# Patient Record
Sex: FEMALE | Race: WHITE
[De-identification: names, ages, dates, MRNs, and addresses within clinical notes are randomized per-mention and may not be internally consistent; named-entity substitution may affect disease eponyms.]

---

## 2017-08-27 NOTE — EDM.PDOC
ED HPI GENERAL MEDICAL PROBLEM





- General


Chief Complaint: Genitourinary Problem


Stated Complaint: URINARY TRACT INFECTION


Time Seen by Provider: 17 11:15


Source of Information: Reports: Patient


History Limitations: Reports: No Limitations





- History of Present Illness


INITIAL COMMENTS - FREE TEXT/NARRATIVE: 


Janae is an otherwise healthy 36-year-old female who presents to the emergency 

department today with complaints of right lower pelvic pain upon urination. 

Patient was seen in the clinic on Wednesday and diagnosed with a urinary tract 

infection, she was started on a seven-day course of ciprofloxacin. Patient 

endorses dysuria, states she felt very nauseated and ill on Friday at work, 

yesterday she reports she actually felt much better however she was having left 

ankle pain, secondary to concerns for tendon rupture on quinolones, patient 

discontinued the ciprofloxacin yesterday and woke up today with return of her 

symptoms. Patient denies any fever at home, she does endorse nausea and vomiting

, she denies any diarrhea. Patient reports she has mild dysuria. Patient denies 

any violeta blood in her urine. The patient has an IUD and is uncertain of her 

last menstrual cycle.


Onset: Today


  ** Right Lower Abdomen


Pain Score (Numeric/FACES): 8





- Related Data


 Allergies











Allergy/AdvReac Type Severity Reaction Status Date / Time


 


No Known Allergies Allergy   Verified 17 11:01














Past Medical History





- Past Surgical History


GI Surgical History: Reports: Cholecystectomy


Female  Surgical History: Reports:  Section





Social & Family History





- Tobacco Use


Smoking Status *Q: Current Every Day Smoker


Years of Tobacco use: 10


Packs/Tins Daily: 0.5





- Caffeine Use


Caffeine Use: Reports: None





- Recreational Drug Use


Recreational Drug Use: No





ED ROS GENERAL





- Review of Systems


Review Of Systems: See Below


Constitutional: Reports: Decreased Appetite


HEENT: Reports: No Symptoms


Respiratory: Reports: No Symptoms


Cardiovascular: Reports: No Symptoms


Endocrine: Reports: No Symptoms


GI/Abdominal: Reports: Nausea, Vomiting


: Reports: Dysuria, Other (Pelvic pain)


Skin: Reports: No Symptoms


Neurological: Reports: No Symptoms


Psychiatric: Reports: No Symptoms


Hematologic/Lymphatic: Reports: No Symptoms


Immunologic: Reports: No Symptoms





ED EXAM, RENAL/





- Physical Exam


Exam: See Below


Exam Limited By: No Limitations


General Appearance: Alert, WD/WN


Nose: Normal Inspection


Head: Atraumatic


Neck: Normal Inspection, Supple, Non-Tender


Respiratory/Chest: No Respiratory Distress, Lungs Clear


Cardiovascular: Normal Peripheral Pulses, Regular Rate, Rhythm, No Murmur


GI/Abdominal: Normal Bowel Sounds, Soft, Tender (Right lower quadrant, pelvic 

tenderness)


Back Exam: No: CVA Tenderness (R), CVA Tenderness (L)


Extremities: Normal Inspection


Neurological: Alert, Oriented, CN II-XII Intact


Psychiatric: Normal Affect, Normal Mood


Skin Exam: Warm, Dry, Intact, Pallor


Lymphatic: No Adenopathy





Course





- Vital Signs


Last Recorded V/S: 


 Last Vital Signs











Temp  35.7 C   17 11:


 


Pulse  82   17 11:


 


Resp  16   17 11:


 


BP  152/94 H  17 11:


 


Pulse Ox  98   17 11:01








Janae is a 32-year-old female who presents to the emergency department today 

with ongoing right pelvic pain. Please refer to history of present illness and 

focused exam. Patient on exam is tender, she is nontoxic appearing, she is well 

hydrated. Patient declined offer of any pain medication, she did accept 1 g of 

Tylenol and was given 1 L of IV fluids. Patient initially complained of the 

inability to urinate, she was bladder scanned by nursing staff and found to 

have 100 mL's of urine in her bladder. Patient finished a 3 day course of 

ciprofloxacin, urinalysis today is negative for infection. There is no blood in 

patient's urine. CT scan was obtained to rule out ureteral stone as patient did 

have blood in her urine on Wednesday. This was negative for a stone but did 

reveal a 5 x 4 cm cyst to her right ovary which is likely the source of patient'

s pain. I discussed findings of today's exam with patient, she is feeling much 

better after fluids here. I highly encouraged her to follow up with a GYN 

doctor in the next week to discuss her ovarian cyst and recommended ibuprofen 

and Tylenol for her pain as well as staying well-hydrated. Patient's CBC is 

unremarkable, CMP is within normal limits. I feel she is stable to be 

discharged home with close follow-up. Patient was agreeable to plan of care, 

reasons to return to the emergency department were discussed, patient was 

discharged in stable condition.


Of note, urine culture from Wednesday did not grow out any bacteria.





- Orders/Labs/Meds


Orders: 


 Active Orders 24 hr











 Category Date Time Status


 


 Peripheral IV Care [RC] .AS DIRECTED Care  17 11:18 Active


 


 Abdomen Pelvis wo Cont [CT] Stat Exams  17 12:05 Taken


 


 Sodium Chloride 0.9% [Saline Flush] Med  17 11:18 Active





 10 ml FLUSH ASDIRECTED PRN   


 


 Peripheral IV Insertion Adult [OM.PC] Routine Oth  17 11:18 Ordered








 Medication Orders





Sodium Chloride (Saline Flush)  10 ml FLUSH ASDIRECTED PRN


   PRN Reason: Keep Vein Open


   Last Admin: 17 11:32  Dose: 10 ml








Labs: 


 Laboratory Tests











  17 Range/Units





  11:28 11:28 11:28 


 


WBC  7.2    (4.5-11.0)  K/uL


 


RBC  4.20    (3.30-5.50)  M/uL


 


Hgb  12.6    (12.0-15.0)  g/dL


 


Hct  38.2    (36.0-48.0)  %


 


MCV  91    (80-98)  fL


 


MCH  30    (27-31)  pg


 


MCHC  33    (32-36)  %


 


Plt Count  250    (150-400)  K/uL


 


Neut % (Auto)  64    (36-66)  %


 


Lymph % (Auto)  26    (24-44)  %


 


Mono % (Auto)  8 H    (2-6)  %


 


Eos % (Auto)  2    (2-4)  %


 


Baso % (Auto)  1    (0-1)  %


 


Sodium   138 L   (140-148)  mmol/L


 


Potassium   3.7   (3.6-5.2)  mmol/L


 


Chloride   106   (100-108)  mmol/L


 


Carbon Dioxide   27   (21-32)  mmol/L


 


Anion Gap   8.7   (5.0-14.0)  mmol/L


 


BUN   9   (7-18)  mg/dL


 


Creatinine   0.7   (0.6-1.0)  mg/dL


 


Est Cr Clr Drug Dosing   112.20   mL/min


 


Estimated GFR (MDRD)   > 60   (>60)  


 


Glucose   83   ()  mg/dL


 


Calcium   8.2 L   (8.5-10.1)  mg/dL


 


Total Bilirubin   0.3   (0.2-1.0)  mg/dL


 


AST   22   (15-37)  U/L


 


ALT   30   (12-78)  U/L


 


Alkaline Phosphatase   62   ()  U/L


 


Total Protein   7.1   (6.4-8.2)  g/dL


 


Albumin   3.4   (3.4-5.0)  g/dL


 


Globulin   3.7 H   (2.3-3.5)  g/dL


 


Albumin/Globulin Ratio   0.9 L   (1.2-2.2)  


 


HCG, Qual    Negative  


 


Urine Color     


 


Urine Appearance     


 


Urine pH     (4.5-8.0)  


 


Ur Specific Gravity     (1.008-1.030)  


 


Urine Protein     (NEGATIVE)  mg/dL


 


Urine Glucose (UA)     (NEGATIVE)  mg/dL


 


Urine Ketones     (NEGATIVE)  mg/dL


 


Urine Occult Blood     (NEGATIVE)  


 


Urine Nitrite     (NEGATIVE)  


 


Urine Bilirubin     (NEGATIVE)  


 


Urine Urobilinogen     (NORMAL)  mg/dL


 


Ur Leukocyte Esterase     (NEGATIVE)  


 


Urine RBC     (0-5)  


 


Urine WBC     (0-5)  


 


Ur Epithelial Cells     


 


Amorphous Sediment     


 


Urine Bacteria     


 


Urine Mucus     














  17 Range/Units





  12:25 


 


WBC   (4.5-11.0)  K/uL


 


RBC   (3.30-5.50)  M/uL


 


Hgb   (12.0-15.0)  g/dL


 


Hct   (36.0-48.0)  %


 


MCV   (80-98)  fL


 


MCH   (27-31)  pg


 


MCHC   (32-36)  %


 


Plt Count   (150-400)  K/uL


 


Neut % (Auto)   (36-66)  %


 


Lymph % (Auto)   (24-44)  %


 


Mono % (Auto)   (2-6)  %


 


Eos % (Auto)   (2-4)  %


 


Baso % (Auto)   (0-1)  %


 


Sodium   (140-148)  mmol/L


 


Potassium   (3.6-5.2)  mmol/L


 


Chloride   (100-108)  mmol/L


 


Carbon Dioxide   (21-32)  mmol/L


 


Anion Gap   (5.0-14.0)  mmol/L


 


BUN   (7-18)  mg/dL


 


Creatinine   (0.6-1.0)  mg/dL


 


Est Cr Clr Drug Dosing   mL/min


 


Estimated GFR (MDRD)   (>60)  


 


Glucose   ()  mg/dL


 


Calcium   (8.5-10.1)  mg/dL


 


Total Bilirubin   (0.2-1.0)  mg/dL


 


AST   (15-37)  U/L


 


ALT   (12-78)  U/L


 


Alkaline Phosphatase   ()  U/L


 


Total Protein   (6.4-8.2)  g/dL


 


Albumin   (3.4-5.0)  g/dL


 


Globulin   (2.3-3.5)  g/dL


 


Albumin/Globulin Ratio   (1.2-2.2)  


 


HCG, Qual   


 


Urine Color  Yellow  


 


Urine Appearance  Clear  


 


Urine pH  6.5  (4.5-8.0)  


 


Ur Specific Gravity  1.010  (1.008-1.030)  


 


Urine Protein  Negative  (NEGATIVE)  mg/dL


 


Urine Glucose (UA)  Normal  (NEGATIVE)  mg/dL


 


Urine Ketones  Negative  (NEGATIVE)  mg/dL


 


Urine Occult Blood  Negative  (NEGATIVE)  


 


Urine Nitrite  Negative  (NEGATIVE)  


 


Urine Bilirubin  Negative  (NEGATIVE)  


 


Urine Urobilinogen  Normal  (NORMAL)  mg/dL


 


Ur Leukocyte Esterase  Negative  (NEGATIVE)  


 


Urine RBC  0-5  (0-5)  


 


Urine WBC  0-5  (0-5)  


 


Ur Epithelial Cells  Few  


 


Amorphous Sediment  Not seen  


 


Urine Bacteria  Not seen  


 


Urine Mucus  Rare  











Meds: 


Medications











Generic Name Dose Route Start Last Admin





  Trade Name Freq  PRN Reason Stop Dose Admin


 


Sodium Chloride  10 ml  17 11:18  17 11:32





  Saline Flush  FLUSH   10 ml





  ASDIRECTED PRN   Administration





  Keep Vein Open   














Discontinued Medications














Generic Name Dose Route Start Last Admin





  Trade Name Freq  PRN Reason Stop Dose Admin


 


Acetaminophen  1,000 mg  17 11:18  17 11:40





  Tylenol Extra Strength  PO  17 11:19  1,000 mg





  ONETIME ONE   Administration


 


Sodium Chloride  1,000 mls @ 999 mls/hr  17 11:19  17 11:32





  Normal Saline  IV  17 12:19  999 mls/hr





  .BOLUS ONE   Administration














Departure





- Departure


Time of Disposition: 14:00


Disposition: Home, Self-Care 01


Condition: Good


Clinical Impression: 


 Pelvic pain





Ovarian cyst


Qualifiers:


 Laterality: right Qualified Code(s): N83.201 - Unspecified ovarian cyst, right 

side








- Discharge Information


Instructions:  Ovarian Cyst


Referrals: 


PCP,None [Primary Care Provider] - 


Forms:  ED Department Discharge


Additional Instructions: 


Janae, he can take 600 mg of ibuprofen every 6 hours as needed for pain. You 

can alternate this with 1000 mg of Tylenol every 6 hours as well.


The size of your ovarian cyst is 5 x 4 cm which is a decent sized cyst so I 

would like you to follow up with a GYN doctor in the next week for follow-up. 

Make sure you are drinking plenty of fluids, return to the emergency department 

with any complications or worsening symptoms.








- My Orders


Last 24 Hours: 


My Active Orders





17 11:18


Peripheral IV Care [RC] .AS DIRECTED 


Sodium Chloride 0.9% [Saline Flush]   10 ml FLUSH ASDIRECTED PRN 


Peripheral IV Insertion Adult [OM.PC] Routine 





17 12:05


Abdomen Pelvis wo Cont [CT] Stat 














- Assessment/Plan


Last 24 Hours: 


My Active Orders





17 11:18


Peripheral IV Care [RC] .AS DIRECTED 


Sodium Chloride 0.9% [Saline Flush]   10 ml FLUSH ASDIRECTED PRN 


Peripheral IV Insertion Adult [OM.PC] Routine 





17 12:05


Abdomen Pelvis wo Cont [CT] Stat

## 2020-06-19 ENCOUNTER — HOSPITAL ENCOUNTER (EMERGENCY)
Dept: HOSPITAL 11 - JP.ED | Age: 35
Discharge: HOME | End: 2020-06-19
Payer: COMMERCIAL

## 2020-06-19 VITALS — HEART RATE: 56 BPM | DIASTOLIC BLOOD PRESSURE: 73 MMHG | SYSTOLIC BLOOD PRESSURE: 124 MMHG

## 2020-06-19 DIAGNOSIS — F17.210: ICD-10-CM

## 2020-06-19 DIAGNOSIS — R05: Primary | ICD-10-CM

## 2020-06-19 PROCEDURE — 94640 AIRWAY INHALATION TREATMENT: CPT

## 2020-06-19 PROCEDURE — 99285 EMERGENCY DEPT VISIT HI MDM: CPT

## 2020-06-19 PROCEDURE — 71046 X-RAY EXAM CHEST 2 VIEWS: CPT

## 2020-06-19 NOTE — CR
CHEST: 2 view

 

CLINICAL HISTORY:Cough

 

COMPARISON:None

 

FINDINGS:  The heart size, pulmonary vascularity and hilar structures are

normal. No infiltrate effusion or pneumothorax is seen.

 

IMPRESSION: No acute cardiopulmonary process.

## 2020-06-19 NOTE — EDM.PDOC
ED HPI GENERAL MEDICAL PROBLEM





- General


Chief Complaint: Respiratory Problem


Stated Complaint: SHORTNESS OF BREATH


Time Seen by Provider: 20 13:23


Source of Information: Reports: Patient, Old Records, RN Notes Reviewed


History Limitations: Reports: No Limitations





- History of Present Illness


INITIAL COMMENTS - FREE TEXT/NARRATIVE: 





35-year-old female presents emergency department a complaint of shortness of 

breath, she does have a history of reactive airway disease particularly when she

is exposed to various chemical products at work.  She is currently being 

evaluated by her primary care provider underwent pulmonary function test last 

week which demonstrated a mild obstructive pattern however she had a negative 

response to the albuterol on the pulmonary function test.  She states she has 

been using albuterol for some time and it has helped her whenever she is felt 

short of breath, does have an extensive tobacco history use





- Related Data


                                    Allergies











Allergy/AdvReac Type Severity Reaction Status Date / Time


 


No Known Allergies Allergy   Verified 20 13:19











Home Meds: 


                                    Home Meds





Albuterol Sulfate [Albuterol Sulfate Hfa] 18 gm IH ASDIRECTED PRN 20 

[History]


Levalbuterol Tartrate [Xopenex HFA] 1 puff INH Q6H PRN #1 inhaler 20 [Rx]











Past Medical History


Respiratory History: Reports: Other (See Below)


Other Respiratory History: Reactive airway disease to certain irritants


Genitourinary History: Reports: Other (See Below)


Other Genitourinary History: uti long ago


OB/GYN History: Reports: Other (See Below)


Other OB/GYN History: cyst removed





- Past Surgical History


GI Surgical History: Reports: Cholecystectomy


Female  Surgical History: Reports:  Section





Social & Family History





- Tobacco Use


Smoking Status *Q: Current Every Day Smoker


Years of Tobacco use: 12


Packs/Tins Daily: 0.5





- Caffeine Use


Caffeine Use: Reports: Coffee





- Recreational Drug Use


Recreational Drug Use: No





ED ROS GENERAL





- Review of Systems


Review Of Systems: See Below


Constitutional: Reports: No Symptoms


Respiratory: Reports: Shortness of Breath, Wheezing, Cough.  Denies: Sputum


Cardiovascular: Reports: No Symptoms





ED EXAM, GENERAL





- Physical Exam


Exam: See Below


Exam Limited By: No Limitations


General Appearance: Alert, WD/WN, No Apparent Distress


Respiratory/Chest: No Respiratory Distress, Decreased Breath Sounds, Wheezing


Cardiovascular: Regular Rate, Rhythm, No Murmur





Course





- Vital Signs


Last Recorded V/S: 


                                Last Vital Signs











Temp  96.8 F L  20 13:37


 


Pulse  56 L  20 13:37


 


Resp  12   20 13:37


 


BP  124/73   20 13:37


 


Pulse Ox  99   20 13:37














- Orders/Labs/Meds


Orders: 


                               Active Orders 24 hr











 Category Date Time Status


 


 RT Aerosol Therapy [RC] ASDIRECTED Care  20 14:21 Active


 


 Chest 2V [CR] Urgent Exams  20 14:12 Taken











Meds: 


Medications














Discontinued Medications














Generic Name Dose Route Start Last Admin





  Trade Name Freq  PRN Reason Stop Dose Admin


 


Levalbuterol HCl  1.25 mg  20 14:21  20 14:26





  Xopenex  NEB  20 14:22  1.25 mg





  ONETIME ONE   Administration














Departure





- Departure


Time of Disposition: 15:14


Disposition: Home, Self-Care 01


Condition: Fair


Clinical Impression: 


 Cough








- Discharge Information


Prescriptions: 


Levalbuterol Tartrate [Xopenex HFA] 1 puff INH Q6H PRN #1 inhaler


 PRN Reason: Cough


Instructions:  Cough, Adult, Easy-to-Read


Referrals: 


Chong Serrano NP [Primary Care Provider] - 


Forms:  ED Department Discharge, ED Return to Work/School Form


Additional Instructions: 


Your medications have been faxed to [facility] please follow-up with your primary 

care in the next 3 to 5 days for further evaluation, consider hypersensitivity 

pneumonitis as a possibility for your ongoing cough your primary care can c

ontinue to evaluate this





Sepsis Event Note (ED)





- Evaluation


Sepsis Screening Result: No Definite Risk





- Focused Exam


Vital Signs: 


                                   Vital Signs











  Temp Pulse Resp BP Pulse Ox


 


 20 13:37  96.8 F L  56 L  12  124/73  99


 


 20 13:05  96.8 F L  56 L  12  124/73  99














- My Orders


Last 24 Hours: 


My Active Orders





20 14:12


Chest 2V [CR] Urgent 





20 14:21


RT Aerosol Therapy [RC] ASDIRECTED 














- Assessment/Plan


Last 24 Hours: 


My Active Orders





20 14:12


Chest 2V [CR] Urgent 





06/19/20 14:21


RT Aerosol Therapy [RC] ASDIRECTED 











Plan: 





Assessment





Acuity = acute





Site and laterality = cough





Etiology  = unclear etiology suspicious for allergen reaction





Manifestations = none





Location of injury =  Home





Lab values = chest x-ray I did review films myself I cannot appreciate any acute

 process, the official read from radiology is pending





Plan


I did review her pulmonary function test which is consistent with a mild 

obstructive pattern does have an extensive smoking history however she had a 

negative response to albuterol.  She did take 1 puff of her albuterol inhaler 

here she thought it helped a little bit.  Empirically I tried Xopenex nebulizer 

which she stated gave her remarkable results and was able to take a deep breath.

  I am suspicious for a hypersensitive pneumonitis given her history of 

recurrent airway difficulty when she returns to work and is exposed to plastic 

vapor and her wood dust seems to do better on the weekends away from work, 

however to continue follow-up with her primary care for further evaluation

















 This note was dictated using dragon voice recognition software please call with

 any questions on syntax or grammar.